# Patient Record
Sex: MALE | Race: WHITE | Employment: FULL TIME | URBAN - METROPOLITAN AREA
[De-identification: names, ages, dates, MRNs, and addresses within clinical notes are randomized per-mention and may not be internally consistent; named-entity substitution may affect disease eponyms.]

---

## 2021-04-18 ENCOUNTER — HOSPITAL ENCOUNTER (EMERGENCY)
Facility: CLINIC | Age: 41
Discharge: HOME | End: 2021-04-18
Attending: EMERGENCY MEDICINE
Payer: COMMERCIAL

## 2021-04-18 ENCOUNTER — APPOINTMENT (OUTPATIENT)
Dept: GENERAL RADIOLOGY | Facility: CLINIC | Age: 41
End: 2021-04-18
Payer: COMMERCIAL

## 2021-04-18 VITALS
DIASTOLIC BLOOD PRESSURE: 84 MMHG | TEMPERATURE: 98.3 F | OXYGEN SATURATION: 97 % | HEART RATE: 89 BPM | RESPIRATION RATE: 18 BRPM | HEIGHT: 76 IN | WEIGHT: 308 LBS | SYSTOLIC BLOOD PRESSURE: 119 MMHG | BODY MASS INDEX: 37.51 KG/M2

## 2021-04-18 DIAGNOSIS — M25.512 LEFT SHOULDER PAIN, UNSPECIFIED CHRONICITY: Primary | ICD-10-CM

## 2021-04-18 PROCEDURE — 73030 X-RAY EXAM OF SHOULDER: CPT

## 2021-04-18 PROCEDURE — 99283 EMERGENCY DEPT VISIT LOW MDM: CPT

## 2021-04-18 ASSESSMENT — PAIN DESCRIPTION - DESCRIPTORS: DESCRIPTORS: STABBING;SHARP;RADIATING

## 2021-04-18 ASSESSMENT — PAIN DESCRIPTION - PAIN TYPE: TYPE: CHRONIC PAIN

## 2021-04-18 ASSESSMENT — PAIN DESCRIPTION - ORIENTATION: ORIENTATION: LEFT

## 2021-04-18 ASSESSMENT — PAIN SCALES - GENERAL: PAINLEVEL_OUTOF10: 9

## 2021-04-18 ASSESSMENT — PAIN DESCRIPTION - LOCATION: LOCATION: SHOULDER

## 2021-04-18 NOTE — ED PROVIDER NOTES
[facility]  [facility]  Phone: [phone]        Pt Name: Robb Chappell  MRN: [id number]  [place] 1980  Date of evaluation: 4/18/21      CHIEF COMPLAINT     Chief Complaint   Patient presents with    Shoulder Pain     left shoulder radiating down arm         HISTORY OF PRESENT ILLNESS  (Location/Symptom, Timing/Onset, Context/Setting, Quality, Duration, Modifying Factors, Severity.)    Robb Chappell is a 36 y.o. male who presents with left shoulder pain. It hs been present for 2 weeks. He states he takes a hydrocodone at night and still does not relieve the pain. He thinks he slept on it wrong. He works as a . The pain is slightly improved by range of motion. He does feel discomfort when he lifts things. He has not yet had any imaging. He has trouble putting on his coat and shirt. No fever or chills. No swelling or erythema. His PCP is Dr Jodie Beach. He has seen his PCP twice for this and they've been giving him pills. He has an appointment with orthopedics on April 29th. He has not yet had any x-rays. He has a known history of cardiomyopathy. He has an AICD. The cardiomyopathy was related to a viral infection. His EF is 30% now. He denies chest pain or shortness of breath. No nausea or vomiting. No diaphoresis. He feels his pain is very localized to the shoulder and radiates down the arm and up into his neck. It is not exertional. He notices it more at rest, especially while sleeping.        REVIEW OF SYSTEMS    (2-9 systems for level 4, 10 or more for level 5)     Constitutional: no fever, chills, fatigue  HENT: No headache, nasal congestion, sore throat, hearing changes, ear pain or discharge  Eyes: no visual changes or photophobia  Respiratory: no cough, shortness of breath, or wheezing  Cardiovascular: no chest pain, palpitations, or leg swelling  Abdominal: no abdominal pain, nausea, vomiting, diarrhea, or constipation  Genitourinary: no dysuria, frequency, or urgency  Musculoskeletal: no arthralgias, myalgias, neck or back pain  Skin: no rash or wound  Neurological: no numbness, tingling or weakness  Hematologic:  no history of easy bleeding or bruising            PAST MEDICAL HISTORY    has a past medical history of CHF (congestive heart failure) ([address] and Pacemaker. SURGICAL HISTORY      has a past surgical history that includes Cardiac surgery and Cholecystectomy. CURRENTMEDICATIONS       There are no discharge medications for this patient. ALLERGIES     is allergic to pcn [penicillins]. FAMILY HISTORY     has no family status information on file. family history is not on file. SOCIAL HISTORY      reports that he has never smoked. He has never used smokeless tobacco. He reports previous alcohol use. He reports that he does not use drugs. PHYSICAL EXAM    (up to 7 for level 4, 8 or more for level 5)   INITIAL VITALS:  height is 6' 4\" (1.93 m) and weight is 139.7 kg (308 lb) (abnormal). His oral temperature is 98.3 °F (36.8 °C). His blood pressure is 119/84 and his pulse is 89. His respiration is 18 and oxygen saturation is 97%. Physical Exam  Vitals signs and nursing note reviewed. Constitutional:       Appearance: Normal appearance. Musculoskeletal: Normal range of motion. General: Tenderness present. No swelling. Comments: No erythema or warmth to the joint. Has full ROM, but has pain with extension over his head. Radial pulse is palpable. No pain over the elbow or wrist.    Skin:     General: Skin is warm and dry. Capillary Refill: Capillary refill takes less than 2 seconds. Findings: No bruising or erythema. Neurological:      Mental Status: He is alert and oriented to person, place, and time. Mental status is at baseline. Psychiatric:         Mood and Affect: Mood normal.         Behavior: Behavior normal.         DIFFERENTIAL DIAGNOSIS/ MDM:     Left shoulder pain.  Plan for x-ray and follow up with orthopedics. DIAGNOSTIC RESULTS     EKG: All EKG's are interpreted by the Emergency Department Physician who either signs or Co-signs this chart in the absence of a cardiologist.    none    RADIOLOGY:        Interpretation per the Radiologist below, if available at the time of this note:    Xr Shoulder Left (min 2 Views)    Result Date: 4/18/2021  EXAMINATION: TWO XRAY VIEWS OF THE LEFT SHOULDER 4/18/2021 1:16 pm COMPARISON: None. HISTORY: ORDERING SYSTEM PROVIDED HISTORY: shoulder pain TECHNOLOGIST PROVIDED HISTORY: shoulder pain Reason for Exam: left shoulder pain which radiates down his arm x2 weeks Acuity: Acute Type of Exam: Initial Additional signs and symptoms: NA Relevant Medical/Surgical History: pacemaker FINDINGS: Pacemaker electrode projects over left upper chest partly obscuring the glenoid on the AP view. No evidence for acute fracture or dislocation. No lytic or blastic lesions. AC joint unremarkable. No acute osseous abnormality. LABS:  No results found for this visit on 04/18/21.    none    EMERGENCY DEPARTMENT COURSE:   Vitals:    Vitals:    04/18/21 1249   BP: 119/84   Pulse: 89   Resp: 18   Temp: 98.3 °F (36.8 °C)   TempSrc: Oral   SpO2: 97%   Weight: (!) 139.7 kg (308 lb)   Height: 6' 4\" (1.93 m)     -------------------------  BP: 119/84, Temp: 98.3 °F (36.8 °C), Pulse: 89, Resp: 18          CONSULTS:  none    PROCEDURES:  None    FINAL IMPRESSION      1. Left shoulder pain, unspecified chronicity          DISPOSITION/PLAN   DISPOSITION Decision To Discharge 04/18/2021 02:57:16 PM      CONDITION ON DISPOSITION:   Stable     PATIENT REFERRED TO:    Please keep your appointment with the orthopedics clinic as scheduled. DISCHARGE MEDICATIONS:  There are no discharge medications for this patient.       (Please note that portions of this note were completed with a voicerecognition program.  Efforts were made to edit the dictations but occasionally words are mis-transcribed.)    Daniel Carlton MD  Attending Emergency Medicine Physician        Daniel Carlton MD  04/18/21 1996

## 2021-04-19 ENCOUNTER — OFFICE VISIT (OUTPATIENT)
Dept: ORTHOPEDIC SURGERY | Age: 41
End: 2021-04-19
Payer: COMMERCIAL

## 2021-04-19 VITALS
SYSTOLIC BLOOD PRESSURE: 122 MMHG | HEART RATE: 94 BPM | BODY MASS INDEX: 37.51 KG/M2 | DIASTOLIC BLOOD PRESSURE: 86 MMHG | WEIGHT: 308 LBS | HEIGHT: 76 IN

## 2021-04-19 DIAGNOSIS — M75.102 ROTATOR CUFF SYNDROME OF LEFT SHOULDER: Primary | ICD-10-CM

## 2021-04-19 PROCEDURE — G8417 CALC BMI ABV UP PARAM F/U: HCPCS | Performed by: FAMILY MEDICINE

## 2021-04-19 PROCEDURE — 99203 OFFICE O/P NEW LOW 30 MIN: CPT | Performed by: FAMILY MEDICINE

## 2021-04-19 PROCEDURE — G8427 DOCREV CUR MEDS BY ELIG CLIN: HCPCS | Performed by: FAMILY MEDICINE

## 2021-04-19 PROCEDURE — 1036F TOBACCO NON-USER: CPT | Performed by: FAMILY MEDICINE

## 2021-04-19 RX ORDER — TADALAFIL 20 MG/1
TABLET ORAL
COMMUNITY
Start: 2021-04-06

## 2021-04-19 RX ORDER — FENOFIBRATE 48 MG/1
48 TABLET, COATED ORAL
COMMUNITY

## 2021-04-19 RX ORDER — SPIRONOLACTONE 25 MG/1
50 TABLET ORAL
COMMUNITY
Start: 2020-06-04 | End: 2021-06-06

## 2021-04-19 RX ORDER — BUMETANIDE 2 MG/1
2 TABLET ORAL
COMMUNITY
Start: 2020-10-12

## 2021-04-19 RX ORDER — SACUBITRIL AND VALSARTAN 49; 51 MG/1; MG/1
TABLET, FILM COATED ORAL
COMMUNITY
Start: 2020-09-08

## 2021-04-19 RX ORDER — GLIMEPIRIDE 2 MG/1
2 TABLET ORAL
COMMUNITY

## 2021-04-19 RX ORDER — CARVEDILOL 25 MG/1
TABLET ORAL
COMMUNITY
Start: 2020-11-05

## 2021-04-19 NOTE — PROGRESS NOTES
Sports Medicine Consultation    CHIEF COMPLAINT:  Shoulder Pain (Lt shoulder 2wks. woke up with pain)        HPI:   The patient is a 36 y.o. male who is being seen as a new patient being seen for regarding new problem left shoulder. The patient is a left hand dominant male who has had shoulder pain for weeks. As far as trauma to the shoulder, the patient indicates no specific injury, just slept on it wrong. The pain is  worse at night and when doing overhead activities. Weakness of the shoulder minimally  been noted. The pain restricts activities such as sleeping. The pain does not seem to improve with time. The following medications have been tried: narcs without significant benefit. Physical Therapy has not been tried. Corticosteroid injection has not been done. Neck pain has been present but radiating up from shoulder. he has a past medical history of CHF (congestive heart failure) ([address] and Pacemaker. he has a past surgical history that includes Cardiac surgery and Cholecystectomy. Past Medical History:   Diagnosis Date    [facility]est[facility]) ([facility]     Pacemaker        Past Surgical History:   Procedure Laterality Date    CARDIAC SURGERY      CHOLECYSTECTOMY         family history is not on file.     Social History     Socioeconomic History    Marital status: Single     Spouse name: Not on file    Number of children: Not on file    Years of education: Not on file    Highest education level: Not on file   Occupational History    Not on file   Social Needs    Financial resource strain: Not on file    Food insecurity     Worry: Not on file     Inability: Not on file    Transportation needs     Medical: Not on file     Non-medical: Not on file   Tobacco Use    Smoking status: Never Smoker    Smokeless tobacco: Never Used   Substance and Sexual Activity    Alcohol use: Not Currently    Drug use: Never    Sexual activity: Yes   Lifestyle    Physical activity Days per week: Not on file     Minutes per session: Not on file    Stress: Not on file   Relationships    Social connections     Talks on phone: Not on file     Gets together: Not on file     Attends Yarsani service: Not on file     Active member of club or organization: Not on file     Attends meetings of clubs or organizations: Not on file     Relationship status: Not on file    Intimate partner violence     Fear of current or ex partner: Not on file     Emotionally abused: Not on file     Physically abused: Not on file     Forced sexual activity: Not on file   Other Topics Concern    Not on file   Social History Narrative    Not on file       Current Outpatient Medications   Medication Sig Dispense Refill    apixaban (ELIQUIS) 5 MG TABS tablet Take 5 mg by mouth 2 times daily      bumetanide (BUMEX) 2 MG tablet Take 2 mg by mouth      carvedilol (COREG) 25 MG tablet TAKE 1 TABLET BY MOUTH TWO TIMES A DAY WITH MEALS      sacubitril-valsartan (ENTRESTO) 49-51 MG per tablet TAKE 1 TABLET BY MOUTH TWO TIMES A DAY      tadalafil (CIALIS) 20 MG tablet TAKE 1 TABLET BY MOUTH ONE TIME A DAY AS NEEDED FOR ERECTILE DYSFUNCTION      spironolactone (ALDACTONE) 25 MG tablet Take 50 mg by mouth      Empagliflozin (JARDIANCE PO) Take by mouth      fenofibrate (TRICOR) 48 MG tablet Take 48 mg by mouth      glimepiride (AMARYL) 2 MG tablet Take 2 mg by mouth      metFORMIN (GLUCOPHAGE) 500 MG tablet Take 500 mg by mouth 2 times daily (with meals)      SITagliptin (JANUVIA) 100 MG tablet Take 100 mg by mouth       No current facility-administered medications for this visit. Allergies:  heis allergic to pcn [penicillins]. ROS:  CV:  Denies chest pain; palpitations; shortness of breath; swelling of feet, ankles; and loss of consciousness. CON: Denies fever and dizziness. ENT:  Denies hearing loss / ringing, ear infections hoarseness, and swallowing problems.   RESP:  Denies chronic cough, spitting up shoulder pain which radiates down his arm x2 weeks Acuity: Acute Type of Exam: Initial Additional signs and symptoms: NA Relevant Medical/Surgical History: pacemaker FINDINGS: Pacemaker electrode projects over left upper chest partly obscuring the glenoid on the AP view. No evidence for acute fracture or dislocation. No lytic or blastic lesions. AC joint unremarkable. No acute osseous abnormality. IMPRESSION:     1. Rotator cuff syndrome of left shoulder        PLAN:   We discussed some of the etiologies and natural histories of     ICD-10-CM    1. Rotator cuff syndrome of left shoulder  M75.102 [facility] Physical [facility]     We discussed the various treatment alternatives including anti-inflammatory medications, physical therapy, injections, further imaging studies and as a last resort surgery. 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

## 2021-04-26 ENCOUNTER — HOSPITAL ENCOUNTER (OUTPATIENT)
Dept: PHYSICAL THERAPY | Facility: CLINIC | Age: 41
Setting detail: THERAPIES SERIES
Discharge: HOME | End: 2021-04-26
Payer: COMMERCIAL

## 2021-04-26 PROCEDURE — 97161 PT EVAL LOW COMPLEX 20 MIN: CPT

## 2021-04-26 PROCEDURE — 97140 MANUAL THERAPY 1/> REGIONS: CPT

## 2021-04-26 NOTE — CONSULTS
[] [facility]  Outpatient Rehabilitation &  Therapy  [facility]   [place]: [phone]  F: [phone]        Physical Therapy Upper Extremity Evaluation    Date:  2021  Patient: [place]  : 1980  MRN: [id number]  Physician: Dr Cindy Gross DO   Insurance: [employer]/med [place] 60 v, hard max  Medical Diagnosis: LUE shoulder pain   Rehab Codes: M75.102, M62.81 (Muscle Weakness), M62.9 (Disorder of Muscle), M79.1 (Myalgia), Z73.6 (Limitation of ADLs)   Onset Date: 21  Next 's appt.: na    Subjective:   CC/HPI: Pt with LUE shoulder pain that started on 21 woke up on it with pain. Pain is in the neck to shoulder down tot he elbow. Pain has been constant since he slept on it wrong. Saw PCP, ER did XR (-), sent to see Dr Krzysztof Moffett. Pt has been given muscle relaxors, hydrocodone for pain.          PMHx:   CHF (congestive heart failure) ([facility] 2015       Pacemaker         Coma, had PT following for strengthening in 2016     Past Surgical History         Past Surgical History:   Procedure Laterality Date    CARDIAC SURGERY        CHOLECYSTECTOMY                 [] Unremarkable             [] Refer to full medical chart  In EPIC     Tests: [x] X-Ray: LUE shoulder (-)    [] MRI:   [] Other:      Comorbidities:   [] Obesity [] Dialysis  [] N/A   [] Asthma/COPD [] Dementia [] Other:   [] Stroke [] Sleep apnea [] Other:   [] Vascular disease [] Rheumatic disease [] Other:       ADL/IADL Previous level of function Current level of function Who currently assists the patient with task   Bathing  [] Independent  [] Assist [x] Independent  [] Assist    Dress/grooming [] Independent  [] Assist [x] Independent  [] Assist    Transfer/mobility [] Independent  [] Assist [x] Independent  [] Assist    Feeding [] Independent  [] Assist [x] Independent  [] Assist    Toileting [] Independent  [] Assist [x] Independent  [] Assist    Driving [] Independent  [] Assist [x] Independent  [] Assist Housekeeping [] Independent  [] Assist [x] Independent  [] Assist    Grocery shop/meal prep [] Independent  [] Assist [x] Independent  [] Assist        Medications: [x] Refer to full medical record [] None [] Other:  Allergies:      [x] Refer to full medical record [] None [] Other:    Function:  Hand Dominance  [] Right  [x] Left  Marital Status Lives with wife, daughter    Michelle Cease status Office, computer    Work Activities/duties     Recreational Activities Video games       Pain present?  yes   Location LUE shoulder middle deltoid, neck   Pain Rating currently 4/10   Pain at worse 9/10   Pain at best 3/10   Description of pain Dull ache, stabbing   Altered Sensation LUE forearm intermittent numbness    What makes it worse Night    What makes it better heat   Symptom progression same   Sleep Difficult due to pain               Objective:                                              Strength    Left   C5 Shld Abd 4   Shld Flexion 4+   Shld IR 4+   Shld ER 4   Shld HAB 4   Shld Ext 5   C6 Elb Flex 5   C7 Elb Ext 5       Prone:    Retraction 4   Depression 4   IR 4-   Abd 4-   Scaption 4-   Flexion                  AROM PROM    Left Right Left Right   Shld Abd 155 WNL all 180 WNL all   Shld Flex 160  180    Shld IR T8      Shld ER 90                         ROM   Cervical    Flexion WNL   Extension WNL   Rotation L winslow 25% R winslow 50%   Sidebend L winslow 25% R winslow 50%   Retraction            TESTS (+/-) LEFT RIGHT Not Tested   Neers +  []   Matthew +  []   Empty Can +  []   Drop Arm -  []      []      []       OBSERVATION No Deficit Deficit Not Tested Comments   Posture       Forward Head [] [] []    Rounded Shoulders [] [] []    Kyphosis [] [] []    Slumped Sitting [] [] []    Palpation [] [] []        Somatic Dysfunctions Normal Deficit Details   Cervical   [x] []    Thoracic   [] [x] FRSL T4-T8   Rib   [] [x] L 4-8th rib  L 1st rib elevated    Pelvis   [x] []    Lumbar [x] []    SI   [x] []        Flexibility Normal LUE Deficit RUE Deficit   UTrap [] [x] []   L. Scap [] [x] []   Scalenes  [] [x] []   Pec Major [] [x] []   Pec Minor [] [x] []           Functional Test: Quick DASH  Score: 39% functionally impaired           Assessment:    Patient would benefit from skilled physical therapy services in order to decrease LUE pain with ADLs      Problems:    [x] ? Pain:  [x] ? ROM:  [x] ? Strength:  [] ? Function:  [] Other:         STG: (to be met in 10 treatments)  1. ? Pain: Pt to decrease pain levels to 4/10  2. ? ROM: Increase flexibility throughout to ease ADL progression  3. ? Strength: LLE increase to 5/5 MMT  4. Independent with Home Exercise Programs    LTG: (to be met in 20 treatments)  1. Improve score of functional assessment tool Quick DASH to 20% impairment or less  2. Decrease pain with LUE ADLs to 1/10 or less     Patient goals: Decrease LUE pain    Rehab Potential:  [x] Good  [] Fair  [] Poor   Suggested Professional Referral:  [x] No  [] Yes:  Barriers to Goal Achievement[de-identified]  [x] No  [] Yes:  Domestic Concerns:  [x] No  [] Yes:    Pt. Education:  [x] Plans/Goals, Risks/Benefits discussed  [x] Home exercise program  Method of Education: [x] Verbal  [x] Demo  [] Written  Comprehension of Education:  [x] Verbalizes understanding. [x] Demonstrates understanding. [x] Needs Review. [] Demonstrates/verbalizes understanding of HEP/Ed previously given.     Treatment Plan:  [x] Therapeutic Exercise    [] Modalities:  [] Therapeutic Activity    [] Ultrasound  [] Electrical Stimulation  [] Gait Training     [] Lumbar/Cervical Traction  [x] Neuromuscular Re-education [] Cold/hotpack [] Iontophoresis: 4 mg/mL  [x] Instruction in HEP              Dexamethasone Sodium Phosphate 40-80 mAmin  [x] Manual Therapy             []  Aquatic Therapy       [x] Vasocompression: Arden Johnson  [] Other:    []  Medication allergies reviewed for use of    Dexamethasone Sodium Phosphate 4mg/ml     with iontophoresis treatments. Pt is not allergic. Frequency:  2 x/week for 20 visits      Todays Treatment:       Exercises:  Exercise    LUE Shoulder pain  Reps/ Time Weight/ Level Comments         Bike            *UT S      *Scalene S      midback S      *Doorway S      *shld retraction            TBand rows      Ext      ER      IR      HAB            Other:  Somatic Dysfunctions Normal Deficit Details   Cervical   [x] []    Thoracic   [] [x] FRSL T4-T8-MOB   Rib   [] [x] L 4-8th rib-MOB  L 1st rib elevated -MET       Specific Instructions for next treatment: therex to focus on postural corrections, midback strength     Evaluation Complexity:  History (Personal factors, comorbidities) [] 0 [] 1-2 [] 3+   Exam (limitations, restrictions) [] 1-2 [] 3 [] 4+   Clinical presentation (progression) [x] Stable [] Evolving  [] Unstable   Decision Making [x] Low [] Moderate [] High    [x] Low Complexity [] Moderate Complexity [] High Complexity       Treatment Charges: Mins Units   [x] Evaluation       [x]  Low       []  Moderate       []  High 25 1   []  Modalities     []  Ther Exercise     [x]  Manual Therapy 10 1   []  Ther Activities     []  Aquatics     []  Vasocompression     []  Other       TOTAL TREATMENT TIME: 40    Time in: 1800      [facility]    Electronically signed by: Sukhwinder Hewitt PT        Physician Signature:________________________________Date:__________________  By signing above or cosigning this note, I have reviewed this plan of care and certify a need for medically necessary rehabilitation services.      *PLEASE SIGN ABOVE AND FAX BACK ALL PAGES*

## 2021-05-03 ENCOUNTER — HOSPITAL ENCOUNTER (OUTPATIENT)
Dept: PHYSICAL THERAPY | Facility: CLINIC | Age: 41
Setting detail: THERAPIES SERIES
Discharge: HOME | End: 2021-05-03
Payer: COMMERCIAL

## 2021-05-03 PROCEDURE — 97110 THERAPEUTIC EXERCISES: CPT

## 2021-05-03 PROCEDURE — 97140 MANUAL THERAPY 1/> REGIONS: CPT

## 2021-05-03 NOTE — FLOWSHEET NOTE
[x] [facility]  Outpatient Rehabilitation &  Therapy  [facility]   [place]: [phone]  F: [phone]      Physical Therapy Daily Treatment Note    Date:  5/3/2021  Patient Name:  Haile Mauricio    :  1980  MRN: [id number]  Physician: Dr Ashley Rios DO                               Insurance: [employer]/med [place] 60 v, hard max  Medical Diagnosis: LUE shoulder pain                    Rehab Codes: M75.102, M62.81 (Muscle Weakness), M62.9 (Disorder of Muscle), M79.1 (Myalgia), Z73.6 (Limitation of ADLs)   Onset Date: 21                Next 's appt.: na  Visit# / total visits:      Cancels/No Shows: 0/0    Subjective:    Pain:  [] Yes  [] No Location: LUE Shoulder  Pain Rating: (0-10 scale) 2/10  Pain altered Tx:  [] No  [] Yes  Action:  Comments: Pt with mild pain in the LUE shoulder today, reports adherence to HEP at this time and that he is trying to be more aware of his posture. Objective:  Exercises:  Exercise     LUE Shoulder pain  Reps/ Time Weight/ Level Comments             Bike  10'                 *UT S  30\"x3       *Scalene S  30\"x3       midback S  30\"x3       *Doorway S  30\"x3       *shld retraction  x20                 TBand   Retraction     Purple x20       Ext Purple x20       ER Purple x20       IR Purple x20       HAB                   Other:      Somatic Dysfunctions Normal Deficit Details   Cervical    [x]?  []?      Thoracic    []?  [x]?  FRSL T4-T8-MOB  MFR L side midback hypervolt    Rib    []?  [x]?  L 4-8th rib-MOB  L 1st rib elevated -MET  L 2nd tib post-MET         Treatment Charges: Mins Units   []  Modalities     [x]  Ther Exercise 40 3   [x]  Manual Therapy 15 1   []  Ther Activities     []  Aquatics     []  Vasocompression     []  Other     Total Treatment time 55 4       Assessment: [x] Progressing toward goals. [] No change.      [] Other:  [x] Patient would continue to benefit from skilled physical therapy services in order to decrease pain, improve mobility     STG/LTG:  STG: (to be met in 10 treatments)  1. ? Pain: Pt to decrease pain levels to 4/10  2. ? ROM: Increase flexibility throughout to ease ADL progression  3. ? Strength: LLE increase to 5/5 MMT  4. Independent with Home Exercise Programs     LTG: (to be met in 20 treatments)  1. Improve score of functional assessment tool Quick DASH to 20% impairment or less  2. Decrease pain with LUE ADLs to 1/10 or less        Pt. Education:  [x] Yes  [] No  [x] Reviewed Prior HEP/Ed  Method of Education: [x] Verbal  [x] Demo  [] Written  Comprehension of Education:  [x] Verbalizes understanding. [x] Demonstrates understanding. [x] Needs review. [] Demonstrates/verbalizes HEP/Ed previously given. Plan: [x] Continue current frequency toward long and short term goals.     [x] Specific Instructions for subsequent treatments: Advance as tolerated       Time In:1600            Time Out: 1700    Electronically signed by:  Chelsie Garcia, PT

## 2021-05-06 ENCOUNTER — HOSPITAL ENCOUNTER (OUTPATIENT)
Dept: PHYSICAL THERAPY | Facility: CLINIC | Age: 41
Setting detail: THERAPIES SERIES
Discharge: HOME | End: 2021-05-06
Payer: COMMERCIAL

## 2021-05-06 PROCEDURE — 97110 THERAPEUTIC EXERCISES: CPT

## 2021-05-06 PROCEDURE — 97140 MANUAL THERAPY 1/> REGIONS: CPT

## 2021-05-06 NOTE — FLOWSHEET NOTE
[x] [facility]  Outpatient Rehabilitation &  Therapy  [facility]   [place]: [phone]  F: [phone]      Physical Therapy Daily Treatment Note    Date:  2021  Patient Name:  Vani Cote    :  1980  MRN: [id number]  Physician: Dr Tavon Hamilton DO                               Insurance: [employer]/med [place] 60 v, hard max  Medical Diagnosis: LUE shoulder pain                    Rehab Codes: M75.102, M62.81 (Muscle Weakness), M62.9 (Disorder of Muscle), M79.1 (Myalgia), Z73.6 (Limitation of ADLs)   Onset Date: 21                   Next 's appt.: na  Visit# / total visits: 3/20               Cancels/No Shows: 0/0        Subjective:    Pain:  [] Yes  [] No Location: LUE Shoulder  Pain Rating: (0-10 scale) 3/10  Pain altered Tx:  [] No  [] Yes  Action:  Comments: Pt with mild pain in today noted in the LUE shoulder. Patient reports adherence to HEP for stretches but has not done his resistance band ex's since last visit. Objective:  Exercises:  Exercise     LUE Shoulder pain  Reps/ Time Weight/ Level Comments             Bike  10'                 *UT S  30\"x3       *Scalene S  30\"x3       midback S  30\"x3       *Doorway S  30\"x3       *shld retraction  x20                   TBand           Retraction  Purple x20       Ext Purple x20       ER Purple x20       IR Purple x20       HAB Purple x20             Prone      Rows x20  5#    Ext x20  5#    HAB x20  5#              Other:      Somatic Dysfunctions Normal Deficit Details   Cervical    [x]?  []?      Thoracic    []?  [x]?  FRSL T4-T8-MOB  MFR L side midback hypervolt    Rib    []?  [x]?  L 4-8th rib-MOB  L 1st rib elevated -MET, MOB  L 2nd tib post-MET         Treatment Charges: Mins Units   []  Modalities     [x]  Ther Exercise 30 2   [x]  Manual Therapy 15 1   []  Ther Activities     []  Aquatics     []  Vasocompression     []  Other     Total Treatment time 45 3       Assessment: [x] Progressing toward goals. Patient reports less pain following ex's, needed cues for proper form in prone positions. Discussed need to focus on postural awareness for patient with sitting, desk work. [] No change. [] Other:  [x] Patient would continue to benefit from skilled physical therapy services in order to decrease pain, improve mobility     STG/LTG:  STG: (to be met in 10 treatments)  1. ? Pain: Pt to decrease pain levels to 4/10  2. ? ROM: Increase flexibility throughout to ease ADL progression  3. ? Strength: LLE increase to 5/5 MMT  4. Independent with Home Exercise Programs     LTG: (to be met in 20 treatments)  1. Improve score of functional assessment tool Quick DASH to 20% impairment or less  2. Decrease pain with LUE ADLs to 1/10 or less        Pt. Education:  [x] Yes  [] No  [x] Reviewed Prior HEP/Ed  Method of Education: [x] Verbal  [x] Demo  [] Written  Comprehension of Education:  [x] Verbalizes understanding. [x] Demonstrates understanding. [x] Needs review. [] Demonstrates/verbalizes HEP/Ed previously given. Plan: [x] Continue current frequency toward long and short term goals.     [x] Specific Instructions for subsequent treatments: Advance as tolerated       Time In:1600            Time Out: 1520    Electronically signed by:  Jyoti Jones PT

## 2021-05-10 ENCOUNTER — HOSPITAL ENCOUNTER (OUTPATIENT)
Dept: PHYSICAL THERAPY | Facility: CLINIC | Age: 41
Setting detail: THERAPIES SERIES
Discharge: HOME | End: 2021-05-10
Payer: COMMERCIAL

## 2021-05-10 PROCEDURE — 97110 THERAPEUTIC EXERCISES: CPT

## 2021-05-10 PROCEDURE — 97140 MANUAL THERAPY 1/> REGIONS: CPT

## 2021-05-10 NOTE — FLOWSHEET NOTE
[x] [facility]  Outpatient Rehabilitation &  Therapy  [facility]   [place]: [phone]  F: [phone]      Physical Therapy Daily Treatment Note    Date:  5/10/2021  Patient Name:  Kings Thompson    :  1980  MRN: [id number]  Physician: Dr Adrianna Mason DO                               Insurance: [employer]/med [place] 60 v, hard max  Medical Diagnosis: LUE shoulder pain                    Rehab Codes: M75.102, M62.81 (Muscle Weakness), M62.9 (Disorder of Muscle), M79.1 (Myalgia), Z73.6 (Limitation of ADLs)   Onset Date: 21                   Next 's appt.: na  Visit# / total visits: 3/20               Cancels/No Shows: 0/0        Subjective:    Pain:  [] Yes  [] No Location: LUE Shoulder  Pain Rating: (0-10 scale) 3/10  Pain altered Tx:  [] No  [] Yes  Action:  Comments: Pt with mild pain in today noted in the LUE shoulder/bicep that is 3/10. Patient reports adherence to HEP at this time.          Objective:  Exercises:  Exercise     LUE Shoulder pain  Reps/ Time Weight/ Level Comments             Bike  10'                 *UT S  30\"x3       *Scalene S  30\"x3       midback S  30\"x3       *Doorway S  30\"x3       *shld retraction  x20                   TBand           Retraction  Purple x20       Ext Purple x20       ER Purple x20       IR Purple x20       HAB Purple x20             Prone      Rows x20  5#    Ext x20  5#    HAB x20  5#          Supine      Punches 3 way 2x10  5#    ABCs x1  5#             Other:      Somatic Dysfunctions Normal Deficit Details   Cervical    [x]?  []?      Thoracic    []?  [x]?  FRSL T4-T8-MOB  MFR L side midback hypervolt    Rib    []?  [x]?  L 4-8th rib-MOB  L 1st rib elevated -MET, MOB  L 2nd tib post-MET         Treatment Charges: Mins Units   []  Modalities     [x]  Ther Exercise 35 2   [x]  Manual Therapy 10 1   []  Ther Activities     []  Aquatics     []  Vasocompression     []  Other     Total Treatment time 45 3       Assessment: [x]

## 2021-05-13 ENCOUNTER — HOSPITAL ENCOUNTER (OUTPATIENT)
Dept: PHYSICAL THERAPY | Facility: CLINIC | Age: 41
Setting detail: THERAPIES SERIES
Discharge: HOME | End: 2021-05-13
Payer: COMMERCIAL

## 2021-05-13 PROCEDURE — 97140 MANUAL THERAPY 1/> REGIONS: CPT

## 2021-05-13 PROCEDURE — 97110 THERAPEUTIC EXERCISES: CPT

## 2021-05-13 NOTE — FLOWSHEET NOTE
[x] [facility]  Outpatient Rehabilitation &  Therapy  [facility]   [place]: [phone]  F: [phone]      Physical Therapy Daily Treatment Note    Date:  2021  Patient Name:  Vani Cote    :  1980  MRN: [id number]  Physician: Dr Tavon Hamilton DO                               Insurance: [employer]/med [place] 60 v, hard max  Medical Diagnosis: LUE shoulder pain                    Rehab Codes: M75.102, M62.81 (Muscle Weakness), M62.9 (Disorder of Muscle), M79.1 (Myalgia), Z73.6 (Limitation of ADLs)   Onset Date: 21                   Next 's appt.: na  Visit# / total visits:                Cancels/No Shows: 0/0      Subjective:    Pain:  [] Yes  [] No Location: LUE Shoulder  Pain Rating: (0-10 scale) 3/10  Pain altered Tx:  [] No  [] Yes  Action:  Comments: Pt with no pain noted today, feels he is moving better overall and is sleeping well at night. Reports adherence to HEP       Objective:  Exercises:  Exercise     LUE Shoulder pain  Reps/ Time Weight/ Level Comments             Bike  10'                       TBand           Retraction  Purple x20       Ext Purple x20       ER Purple x20       IR Purple x20       HAB Purple x20             Prone      Rows x20  6#    Ext x20  6#    HAB x20  6#    Scaption  x20  6#          Supine      Punches 3 way 2x10  6#    ABCs x1  6#    SL ER 2x10  6#     SL Abd 2x10  6#    Other:      Somatic Dysfunctions Normal Deficit Details   Cervical    [x]?  []?      Thoracic    []?  [x]?  FRSL T4-T8-MOB     Rib    []?  [x]?  L 4-8th rib-MOB  L 1st rib elevated -MET, MOB  L 2nd tib post-MET         Treatment Charges: Mins Units   []  Modalities     [x]  Ther Exercise 35 2   [x]  Manual Therapy 10 1   []  Ther Activities     []  Aquatics     []  Vasocompression     []  Other     Total Treatment time 45 3       Assessment: [x] Progressing toward goals.  Good recall on HEP, added new ex's and increased resistance with good tolerance from patient. [] No change. [] Other:  [x] Patient would continue to benefit from skilled physical therapy services in order to decrease pain, improve mobility     STG/LTG:  STG: (to be met in 10 treatments)  1. ? Pain: Pt to decrease pain levels to 4/10  2. ? ROM: Increase flexibility throughout to ease ADL progression  3. ? Strength: LLE increase to 5/5 MMT  4. Independent with Home Exercise Programs     LTG: (to be met in 20 treatments)  1. Improve score of functional assessment tool Quick DASH to 20% impairment or less  2. Decrease pain with LUE ADLs to 1/10 or less        Pt. Education:  [x] Yes  [] No  [x] Reviewed Prior HEP/Ed  Method of Education: [x] Verbal  [x] Demo  [] Written  Comprehension of Education:  [x] Verbalizes understanding. [x] Demonstrates understanding. [x] Needs review. [] Demonstrates/verbalizes HEP/Ed previously given. Plan: [x] Continue current frequency toward long and short term goals.     [x] Specific Instructions for subsequent treatments: Advance as tolerated       Time In:1602            Time Out: 7788    Electronically signed by:  Irasema Avery, PT

## 2021-05-20 ENCOUNTER — HOSPITAL ENCOUNTER (OUTPATIENT)
Dept: PHYSICAL THERAPY | Facility: CLINIC | Age: 41
Setting detail: THERAPIES SERIES
End: 2021-05-20
Payer: COMMERCIAL

## 2021-12-01 ENCOUNTER — HOSPITAL ENCOUNTER (OUTPATIENT)
Age: 41
Setting detail: SPECIMEN
Discharge: HOME | End: 2021-12-01

## 2021-12-01 ENCOUNTER — OFFICE VISIT (OUTPATIENT)
Dept: FAMILY MEDICINE CLINIC | Age: 41
End: 2021-12-01
Payer: COMMERCIAL

## 2021-12-01 VITALS
HEART RATE: 67 BPM | OXYGEN SATURATION: 97 % | WEIGHT: 308 LBS | BODY MASS INDEX: 37.49 KG/M2 | RESPIRATION RATE: 12 BRPM | TEMPERATURE: 98.4 F

## 2021-12-01 DIAGNOSIS — Z20.822 CLOSE EXPOSURE TO COVID-19 VIRUS: ICD-10-CM

## 2021-12-01 DIAGNOSIS — Z11.52 ENCOUNTER FOR SCREENING FOR COVID-19: ICD-10-CM

## 2021-12-01 DIAGNOSIS — Z11.52 ENCOUNTER FOR SCREENING FOR COVID-19: Primary | ICD-10-CM

## 2021-12-01 PROBLEM — E66.3 OVERWEIGHT: Status: ACTIVE | Noted: 2021-12-01

## 2021-12-01 PROBLEM — D72.829 LEUCOCYTOSIS: Status: ACTIVE | Noted: 2020-10-11

## 2021-12-01 PROBLEM — Z95.810 AUTOMATIC IMPLANTABLE CARDIOVERTER-DEFIBRILLATOR IN SITU: Status: ACTIVE | Noted: 2021-12-01

## 2021-12-01 PROBLEM — K81.0 ACUTE CHOLECYSTITIS: Status: ACTIVE | Noted: 2017-01-19

## 2021-12-01 PROCEDURE — G8427 DOCREV CUR MEDS BY ELIG CLIN: HCPCS | Performed by: NURSE PRACTITIONER

## 2021-12-01 PROCEDURE — G8484 FLU IMMUNIZE NO ADMIN: HCPCS | Performed by: NURSE PRACTITIONER

## 2021-12-01 PROCEDURE — 99202 OFFICE O/P NEW SF 15 MIN: CPT | Performed by: NURSE PRACTITIONER

## 2021-12-01 PROCEDURE — 1036F TOBACCO NON-USER: CPT | Performed by: NURSE PRACTITIONER

## 2021-12-01 PROCEDURE — G8417 CALC BMI ABV UP PARAM F/U: HCPCS | Performed by: NURSE PRACTITIONER

## 2021-12-01 ASSESSMENT — ENCOUNTER SYMPTOMS
NAUSEA: 0
SORE THROAT: 0
RHINORRHEA: 0
SHORTNESS OF BREATH: 0
COUGH: 0
EYE PAIN: 0
CHEST TIGHTNESS: 0
VOMITING: 0

## 2021-12-01 ASSESSMENT — PATIENT HEALTH QUESTIONNAIRE - PHQ9
2. FEELING DOWN, DEPRESSED OR HOPELESS: 0
SUM OF ALL RESPONSES TO PHQ QUESTIONS 1-9: 0
SUM OF ALL RESPONSES TO PHQ QUESTIONS 1-9: 0
1. LITTLE INTEREST OR PLEASURE IN DOING THINGS: 0
SUM OF ALL RESPONSES TO PHQ QUESTIONS 1-9: 0
SUM OF ALL RESPONSES TO PHQ9 QUESTIONS 1 & 2: 0

## 2021-12-01 NOTE — PATIENT INSTRUCTIONS
Learning About Coronavirus [phone])  Coronavirus [phone]): Overview  What is coronavirus (COVID-19)? The coronavirus disease (COVID-19) is caused by a virus. It is an illness that was first found in [place], [place], in December 2019. It has since spread worldwide. The virus can cause fever, cough, and trouble breathing. In severe cases, it can cause pneumonia and make it hard to breathe without help. It can cause death. Coronaviruses are a large group of viruses. They cause the common cold. They also cause more serious illnesses like Middle East respiratory syndrome (MERS) and severe acute respiratory syndrome (SARS). COVID-19 is caused by a novel coronavirus. That means it's a new type that has not been seen in people before. This virus spreads person-to-person through droplets from coughing and sneezing. It can also spread when you are close to someone who is infected. And it can spread when you touch something that has the virus on it, such as a doorknob or a tabletop. What can you do to protect yourself from coronavirus (COVID-19)? The best way to protect yourself from getting sick is to:  · Avoid areas where there is an outbreak. · Avoid contact with people who may be infected. · Wash your hands often with soap or alcohol-based hand sanitizers. · Avoid crowds and try to stay at least 6 feet away from other people. · Wash your hands often, especially after you cough or sneeze. Use soap and water, and scrub for at least 20 seconds. If soap and water aren't available, use an alcohol-based hand . · Avoid touching your mouth, nose, and eyes. What can you do to avoid spreading the virus to others? To help avoid spreading the virus to others:  · Cover your mouth with a tissue when you cough or sneeze. Then throw the tissue in the trash. · Use a disinfectant to clean things that you touch often. · Wear a cloth face cover if you have to go to public areas.   · Stay home if you are sick or have been exposed to the virus. Don't go to school, work, or public areas. And don't use public transportation. · If you are sick:  ? Leave your home only if you need to get medical care. But call the doctor's office first so they know you're coming. And wear a face cover. ? Wear the face cover whenever you're around other people. It can help stop the spread of the virus when you cough or sneeze. ? Clean and disinfect your home every day. Use household  and disinfectant wipes or sprays. Take special care to clean things that you grab with your hands. These include doorknobs, remote controls, phones, and handles on your refrigerator and microwave. And don't forget countertops, tabletops, bathrooms, and computer keyboards. When to call for help  [id number] anytime you think you may need emergency care. For example, call if:  · You have severe trouble breathing. (You can't talk at all.)  · You have constant chest pain or pressure. · You are severely dizzy or lightheaded. · You are confused or can't think clearly. · Your face and lips have a blue color. · You pass out (lose consciousness) or are very hard to wake up. Call your doctor now if you develop symptoms such as:  · Shortness of breath. · Fever. · Cough. If you need to get care, call ahead to the doctor's office for instructions before you go. Make sure you wear a face cover to prevent exposing other people to the virus. Where can you get the latest information? The following health organizations are tracking and studying this virus. Their websites contain the most up-to-date information. Arvin Danielin also learn what to do if you think you may have been exposed to the virus. · U.S. Centers for Disease Control and Prevention (CDC): The CDC provides updated news about the disease and travel advice. The website also tells you how to prevent the spread of infection.  www.cdc.gov  · World Health Organization [facility]): WHO offers information about the virus with fever. Don't use cold water or ice. · Use petroleum jelly on sore skin. This can help if the skin around your nose and lips becomes sore from rubbing a lot with tissues. Tips for isolation  · Wear a cloth face cover when you are around other people. It can help stop the spread of the virus when you cough or sneeze. · Limit contact with people in your home. If possible, stay in a separate bedroom and use a separate bathroom. · Avoid contact with pets and other animals. · Cover your mouth and nose with a tissue when you cough or sneeze. Then throw it in the trash right away. · Wash your hands often, especially after you cough or sneeze. Use soap and water, and scrub for at least 20 seconds. If soap and water aren't available, use an alcohol-based hand . · Don't share personal household items. These include bedding, towels, cups and glasses, and eating utensils. · Clean and disinfect your home every day. Use household  and disinfectant wipes or sprays. Take special care to clean things that you grab with your hands. These include doorknobs, remote controls, phones, and handles on your refrigerator and microwave. And don't forget countertops, tabletops, bathrooms, and computer keyboards. When should you call for help? [id number] anytime you think you may need emergency care. For example, call if you have life-threatening symptoms, such as:  · You have severe trouble breathing. (You can't talk at all.)  · You have constant chest pain or pressure. · You are severely dizzy or lightheaded. · You are confused or can't think clearly. · Your face and lips have a blue color. · You pass out (lose consciousness) or are very hard to wake up. Call your doctor now or seek immediate medical care if:  · You have moderate trouble breathing. (You can't speak a full sentence.)  · You are coughing up blood (more than about 1 teaspoon). · You have signs of low blood pressure.  These include feeling lightheaded; being too weak to stand; and having cold, pale, clammy skin. Watch closely for changes in your health, and be sure to contact your doctor if:  · Your symptoms get worse. · You are not getting better as expected. Call before you go to the doctor's office. Follow their instructions. And wear a cloth face cover. Current as of: April 24, 2020               Content Version: 12.4  © 2006-2020 [employer]. Care instructions adapted under license by your healthcare professional. If you have questions about a medical condition or this instruction, always ask your healthcare professional. [address] any warranty or liability for your use of this information.

## 2021-12-01 NOTE — PROGRESS NOTES
[address] [place] [address] Drive WALK-IN  [facility] [address] [place] [address]  Dept: [phone]  Dept Fax: [phone]    David Robison is a 39 y.o. male who presents today for his medical conditions/complaints of   Chief Complaint   Patient presents with    Other     exposed. no symptoms. HPI:     Pulse 67   Temp 98.4 °F (36.9 °C) (Temporal)   Resp 12   Wt (!) 308 lb (139.7 kg)   SpO2 97%   BMI 37.49 kg/m²       HPI  Pt presented to the clinic today with c/o exposure to COVID-19. Exposure occurred 4 days ago. Pt has no symptoms. No fever, cough, SOB, diarrhea, loss of taste or smell. Vaccinated for COVID- yes  History of COVID- NO  Employed at [employer] in [place], [place]    Past Medical History:   Diagnosis Date    CHF (congestive heart failure) ([facility] [address]     Pacemaker         Past Surgical History:   Procedure Laterality Date    CARDIAC SURGERY      CHOLECYSTECTOMY         No family history on file. Social History     Tobacco Use    Smoking status: Never Smoker    Smokeless tobacco: Never Used   Substance Use Topics    Alcohol use: Not Currently        Prior to Visit Medications    Medication Sig Taking?  Authorizing Provider   apixaban (ELIQUIS) 5 MG TABS tablet Take 5 mg by mouth 2 times daily  Historical Provider, MD   bumetanide (BUMEX) 2 MG tablet Take 2 mg by mouth  Historical Provider, MD   carvedilol (COREG) 25 MG tablet TAKE 1 TABLET BY MOUTH TWO TIMES A DAY WITH MEALS  Historical Provider, MD   fenofibrate (TRICOR) 48 MG tablet Take 48 mg by mouth  Historical Provider, MD   glimepiride (AMARYL) 2 MG tablet Take 2 mg by mouth  Historical Provider, MD   metFORMIN (GLUCOPHAGE) 500 MG tablet Take 500 mg by mouth 2 times daily (with meals)  Historical Provider, MD   sacubitril-valsartan (ENTRESTO) 49-51 MG per tablet TAKE 1 TABLET BY MOUTH TWO TIMES A DAY  Historical Provider, MD   SITagliptin (JANUVIA) 100 MG tablet Take 100 mg by mouth  Historical Provider, MD   tadalafil (CIALIS) 20 MG tablet TAKE 1 TABLET BY MOUTH ONE TIME A DAY AS NEEDED FOR ERECTILE DYSFUNCTION  Historical Provider, MD   spironolactone (ALDACTONE) 25 MG tablet Take 50 mg by mouth  Historical Provider, MD   Empagliflozin (JARDIANCE PO) Take by mouth  Historical Provider, MD       Allergies   Allergen Reactions    Pcn [Penicillins]          Subjective:      Review of Systems   Constitutional: Negative for chills and fever. HENT: Negative for congestion, ear pain, rhinorrhea and sore throat. Eyes: Negative for pain and visual disturbance. Respiratory: Negative for cough, chest tightness and shortness of breath. Cardiovascular: Negative for chest pain, palpitations and leg swelling. Gastrointestinal: Negative for nausea and vomiting. Genitourinary: Negative for decreased urine volume and difficulty urinating. Musculoskeletal: Negative for gait problem, myalgias and neck pain. Skin: Negative for pallor and rash. Neurological: Negative for weakness, light-headedness and headaches. Psychiatric/Behavioral: Negative for sleep disturbance. Objective:     Physical Exam  Vitals and nursing note reviewed. Constitutional:       General: He is not in acute distress. Appearance: Normal appearance. HENT:      Head: Normocephalic and atraumatic. Right Ear: Tympanic membrane and ear canal normal.      Left Ear: Tympanic membrane and ear canal normal.      Nose: Nose normal.      Mouth/Throat:      Lips: Pink. Mouth: Mucous membranes are moist.      Pharynx: Oropharynx is clear. Uvula midline. Eyes:      Extraocular Movements: Extraocular movements intact. Conjunctiva/sclera: Conjunctivae normal.   Cardiovascular:      Rate and Rhythm: Normal rate and regular rhythm. Pulses: Normal pulses. Pulmonary:      Effort: Pulmonary effort is normal.      Breath sounds: Normal breath sounds.    Abdominal:      General: Bowel sounds are normal. Palpations: Abdomen is soft. Musculoskeletal:         General: Normal range of motion. Cervical back: Normal range of motion and neck supple. Skin:     General: Skin is warm and dry. Capillary Refill: Capillary refill takes less than 2 seconds. Coloration: Skin is not pale. Neurological:      Mental Status: He is alert and oriented to person, place, and time. Coordination: Coordination normal.      Gait: Gait normal.   Psychiatric:         Mood and Affect: Mood normal.         Thought Content: Thought content normal.           MEDICAL DECISION MAKING Assessment/Plan:     Jose Rafael Pham was seen today for other. Diagnoses and all orders for this visit:    Encounter for screening for COVID-19  -     COVID-19; Future    Close exposure to COVID-19 virus        No results found for this or any previous visit. Based on the history and exam,   Will send out COVID19 testing. Possible treatment alterations based on the results. Patient instructed to self-quarantine until testing results are back- and to follow the quarantine instructions in the after visit summary. The patient indicates understanding of these issues and agrees with the plan. Educational materials provided on IMNS.    Preventing the Spread of Coronavirus Disease 2019 in Homes and Residential Communities: For the most recent information go to: TranscribeMeCleaners.fi    Patient given educational materials - see patientinstructions. Discussed use, benefit, and side effects of prescribed medications. All patient questions answered. Pt verbalized understanding. Instructed to continue current medications, diet and exercise. Patient agreed with treatment plan. Follow up as directed.      Electronically signed by JOSE Villanueva CNP on 12/1/2021 at 1:49 PM

## 2021-12-02 LAB
SARS-COV-2: NORMAL
SARS-COV-2: NOT DETECTED
SOURCE: NORMAL